# Patient Record
Sex: FEMALE | Race: BLACK OR AFRICAN AMERICAN | NOT HISPANIC OR LATINO | Employment: UNEMPLOYED | URBAN - METROPOLITAN AREA
[De-identification: names, ages, dates, MRNs, and addresses within clinical notes are randomized per-mention and may not be internally consistent; named-entity substitution may affect disease eponyms.]

---

## 2022-04-19 ENCOUNTER — TELEPHONE (OUTPATIENT)
Dept: OBSTETRICS AND GYNECOLOGY | Facility: CLINIC | Age: 32
End: 2022-04-19
Payer: MEDICAID

## 2022-04-19 NOTE — TELEPHONE ENCOUNTER
Spoke with patient and appt was made ----- Message from Stacy Henderson sent at 4/19/2022 11:55 AM CDT -----  Type: Needs Medical Advice  Who Called:  Pt  Best Call Back Number: [phone]  Additional Information: Pt requesting appt for Pregnancy conformation-Pt last menstrual started Feb 16th--please advise--thank you

## 2022-04-29 ENCOUNTER — OFFICE VISIT (OUTPATIENT)
Dept: OBSTETRICS AND GYNECOLOGY | Facility: CLINIC | Age: 32
End: 2022-04-29
Payer: MEDICAID

## 2022-04-29 VITALS
BODY MASS INDEX: 27.47 KG/M2 | WEIGHT: 175 LBS | SYSTOLIC BLOOD PRESSURE: 138 MMHG | HEIGHT: 67 IN | DIASTOLIC BLOOD PRESSURE: 98 MMHG

## 2022-04-29 DIAGNOSIS — N92.6 MISSED MENSES: Primary | ICD-10-CM

## 2022-04-29 DIAGNOSIS — Z32.01 POSITIVE URINE PREGNANCY TEST: ICD-10-CM

## 2022-04-29 DIAGNOSIS — R03.0 ELEVATED BLOOD PRESSURE READING IN OFFICE WITHOUT DIAGNOSIS OF HYPERTENSION: ICD-10-CM

## 2022-04-29 PROCEDURE — 99204 OFFICE O/P NEW MOD 45 MIN: CPT | Mod: TH,S$GLB,,

## 2022-04-29 PROCEDURE — 1160F PR REVIEW ALL MEDS BY PRESCRIBER/CLIN PHARMACIST DOCUMENTED: ICD-10-PCS | Mod: CPTII,S$GLB,,

## 2022-04-29 PROCEDURE — 3008F PR BODY MASS INDEX (BMI) DOCUMENTED: ICD-10-PCS | Mod: CPTII,S$GLB,,

## 2022-04-29 PROCEDURE — 3008F BODY MASS INDEX DOCD: CPT | Mod: CPTII,S$GLB,,

## 2022-04-29 PROCEDURE — 3075F PR MOST RECENT SYSTOLIC BLOOD PRESS GE 130-139MM HG: ICD-10-PCS | Mod: CPTII,S$GLB,,

## 2022-04-29 PROCEDURE — 3080F PR MOST RECENT DIASTOLIC BLOOD PRESSURE >= 90 MM HG: ICD-10-PCS | Mod: CPTII,S$GLB,,

## 2022-04-29 PROCEDURE — 3075F SYST BP GE 130 - 139MM HG: CPT | Mod: CPTII,S$GLB,,

## 2022-04-29 PROCEDURE — 99204 PR OFFICE/OUTPT VISIT, NEW, LEVL IV, 45-59 MIN: ICD-10-PCS | Mod: TH,S$GLB,,

## 2022-04-29 PROCEDURE — 1159F MED LIST DOCD IN RCRD: CPT | Mod: CPTII,S$GLB,,

## 2022-04-29 PROCEDURE — 1159F PR MEDICATION LIST DOCUMENTED IN MEDICAL RECORD: ICD-10-PCS | Mod: CPTII,S$GLB,,

## 2022-04-29 PROCEDURE — 3080F DIAST BP >= 90 MM HG: CPT | Mod: CPTII,S$GLB,,

## 2022-04-29 PROCEDURE — 1160F RVW MEDS BY RX/DR IN RCRD: CPT | Mod: CPTII,S$GLB,,

## 2022-04-29 NOTE — PROGRESS NOTES
"SUBJECTIVE:       Chief Complaint: Confirmation (Patient is here for a confirmation of pregnancy. )    History of Present Illness: Teressa Fu is a 32 y.o. female,  who presents for a confirmation of pregnancy.  Patient reports absent menses and a positive home pregnancy test.  She states her menses were previously regular.  She is not currently on any contraception.  Patient denies cramping, and vaginal bleeding.  She reports occasional nausea and vomiting.  She was seen at [facility] for vomiting, vaginal bleeding, and UTI.  Her BP was 151/103 at the time of her ED visit.  She is on keflex for the UTI.  She states the vaginal bleeding has resolved.  Patient reports she is not taking a prenatal vitamin.  History of  X1 and C/S x1 after accidental overdose.  Patient reports she has a history of cocaine abuse.  She delivered her second baby after ingestion of cocaine "laced with fentanyl and benzos."  She denies a history of HTN, GDM, and other pregnancy complications.  She denies cramping, adilson, pelvic pain, and vaginal bleeding today.  She states her last annual with Pap was 2022.  She does not have a history of abnormal Paps.    Review of Systems   Constitutional: Positive for fatigue. Negative for activity change, appetite change, chills, fever and unexpected weight change.   HENT: Negative for nasal congestion, dental problem, ear pain, postnasal drip, rhinorrhea, sinus pressure/congestion, sneezing and sore throat.    Eyes: Negative for discharge, visual disturbance and eye dryness.   Respiratory: Negative for cough, chest tightness and shortness of breath.    Cardiovascular: Negative for chest pain, palpitations and leg swelling.   Gastrointestinal: Positive for nausea and vomiting. Negative for abdominal distention, abdominal pain, change in bowel habit, constipation, diarrhea, reflux and change in bowel habit.   Endocrine: Negative for cold intolerance, heat intolerance, polydipsia and " "polyuria.   Genitourinary: Positive for menstrual irregularity. Negative for difficulty urinating, dyspareunia, dysuria, frequency, genital sores, hot flashes, pelvic pain, urgency, vaginal bleeding, vaginal discharge, vaginal pain and vaginal dryness.   Musculoskeletal: Negative for back pain, myalgias, neck pain and neck stiffness.   Integumentary:  Negative for rash, breast mass, breast discharge and breast tenderness.   Allergic/Immunologic: Negative for environmental allergies and immunocompromised state.   Neurological: Negative for dizziness, syncope, weakness, light-headedness, numbness and headaches.   Hematological: Negative for adenopathy. Does not bruise/bleed easily.   Psychiatric/Behavioral: Negative for confusion, decreased concentration and sleep disturbance. The patient is not nervous/anxious.    Breast: Negative for mass and tenderness        OBJECTIVE:      BP (!) 138/98   Ht 5' 7" (1.702 m)   Wt 79.4 kg (175 lb)   LMP 02/16/2022   BMI 27.41 kg/m²     Physical Exam:   Constitutional: She is oriented to person, place, and time. She appears well-developed and well-nourished. She is cooperative.    HENT:   Head: Normocephalic and atraumatic.      Cardiovascular: Normal rate and regular rhythm.     Pulmonary/Chest: Effort normal.                  Musculoskeletal: Moves all extremeties.      Right lower leg: No edema.      Left lower leg: No edema.       Neurological: She is alert and oriented to person, place, and time. GCS eye subscore is 4. GCS verbal subscore is 5. GCS motor subscore is 6.    Skin: Skin is warm and dry. Capillary refill takes less than 2 seconds.    Psychiatric: She has a normal mood and affect. Her speech is normal and behavior is normal. Mood, affect and thought content normal.         ASSESSMENT:       1. Missed menses    2. Positive urine pregnancy test    3. Elevated blood pressure reading in office without diagnosis of hypertension      PARK by LMP: 11/23/2022  EGA: 10 " weeks 2 days    PARK by reported U/S: 2022  EGA: 9 weeks 2 days      PLAN:      Missed menses  -     POCT urine pregnancy    Positive urine pregnancy test  -     US OB/GYN Procedure ([facility])-Future; Future; Expected date: 2022    Elevated blood pressure reading in office without diagnosis of hypertension    Prenatal Counseling:   Prenatal vitamins with folic acid/folate   Common complaints of pregnancy in the first trimester (nausea, vomiting, fatigue)   Foods to avoid (sushi, fish that are high in mercury, deli meat, and unpasteurized cheeses)   Routine prenatal testing including optional  genetic screening   Risk factors identified by prenatal history   Oriented to practice - discussed anticipated course of prenatal care and indications for ultrasound   Childbirth classes   Hospital facilities for delivery and emergency care   Nutrition and proper weight gain based on the [place] [employer]'s recommendations based on her pre-pregnancy weight   Toxoplasmosis precautions (cats)   Sexual activity and exercise   Environmental/work hazards   Travel   Tobacco (Ask, Advise, Assess, Assist, and Arrange), as well as alcohol and drug use   Use of any medications (including supplements, vitamins, herbs, or OTC Drugs)   Domestic violence   Seat belt use    Patient does desire genetic testing.  She does want to know the sex of the baby.  Start daily prenatal vitamin.  Ultrasound to confirm viability and PARK before next visit.  Discussed ACC/AHA blood pressure classifications and risks associated with hypertension.  Advised patient to monitor daily at home.  Will review BP log at next appointment.  Will discuss treatment options if BP remains significantly elevated at next visit.  Discussed heart-healthy diet and exercise.  Encouraged adequate water intake.  Continue Zofran PRN for nausea and vomiting.  ERLINDA from Dr. Ridley and TERRIE Pelayo for previous records.  Labs and genetic  counseling at next visit.    Follow up in about 2 weeks (around 5/13/2022) for routine OB appointment.

## 2022-05-06 ENCOUNTER — PROCEDURE VISIT (OUTPATIENT)
Dept: OBSTETRICS AND GYNECOLOGY | Facility: CLINIC | Age: 32
End: 2022-05-06
Payer: MEDICAID

## 2022-05-06 DIAGNOSIS — Z32.01 POSITIVE URINE PREGNANCY TEST: ICD-10-CM

## 2022-05-06 PROCEDURE — 76817 US OB/GYN PROCEDURE (VIEWPOINT): ICD-10-PCS | Mod: S$GLB,,, | Performed by: OBSTETRICS & GYNECOLOGY

## 2022-05-06 PROCEDURE — 76817 TRANSVAGINAL US OBSTETRIC: CPT | Mod: S$GLB,,, | Performed by: OBSTETRICS & GYNECOLOGY

## 2022-06-10 ENCOUNTER — OFFICE VISIT (OUTPATIENT)
Dept: OBSTETRICS AND GYNECOLOGY | Facility: CLINIC | Age: 32
End: 2022-06-10
Payer: MEDICAID

## 2022-06-10 VITALS — SYSTOLIC BLOOD PRESSURE: 139 MMHG | DIASTOLIC BLOOD PRESSURE: 87 MMHG | BODY MASS INDEX: 27.96 KG/M2 | WEIGHT: 178.5 LBS

## 2022-06-10 DIAGNOSIS — O09.32 LIMITED PRENATAL CARE IN SECOND TRIMESTER: ICD-10-CM

## 2022-06-10 DIAGNOSIS — O21.9 NAUSEA AND VOMITING IN PREGNANCY: ICD-10-CM

## 2022-06-10 DIAGNOSIS — Z3A.16 16 WEEKS GESTATION OF PREGNANCY: Primary | ICD-10-CM

## 2022-06-10 PROCEDURE — 87186 SC STD MICRODIL/AGAR DIL: CPT

## 2022-06-10 PROCEDURE — 87491 CHLMYD TRACH DNA AMP PROBE: CPT

## 2022-06-10 PROCEDURE — 3079F DIAST BP 80-89 MM HG: CPT | Mod: CPTII,S$GLB,,

## 2022-06-10 PROCEDURE — 3075F PR MOST RECENT SYSTOLIC BLOOD PRESS GE 130-139MM HG: ICD-10-PCS | Mod: CPTII,S$GLB,,

## 2022-06-10 PROCEDURE — 99213 PR OFFICE/OUTPT VISIT, EST, LEVL III, 20-29 MIN: ICD-10-PCS | Mod: TH,S$GLB,,

## 2022-06-10 PROCEDURE — 87591 N.GONORRHOEAE DNA AMP PROB: CPT

## 2022-06-10 PROCEDURE — 3008F BODY MASS INDEX DOCD: CPT | Mod: CPTII,S$GLB,,

## 2022-06-10 PROCEDURE — 87086 URINE CULTURE/COLONY COUNT: CPT

## 2022-06-10 PROCEDURE — 3075F SYST BP GE 130 - 139MM HG: CPT | Mod: CPTII,S$GLB,,

## 2022-06-10 PROCEDURE — 1160F RVW MEDS BY RX/DR IN RCRD: CPT | Mod: CPTII,S$GLB,,

## 2022-06-10 PROCEDURE — 3008F PR BODY MASS INDEX (BMI) DOCUMENTED: ICD-10-PCS | Mod: CPTII,S$GLB,,

## 2022-06-10 PROCEDURE — 99213 OFFICE O/P EST LOW 20 MIN: CPT | Mod: TH,S$GLB,,

## 2022-06-10 PROCEDURE — 1159F MED LIST DOCD IN RCRD: CPT | Mod: CPTII,S$GLB,,

## 2022-06-10 PROCEDURE — 1159F PR MEDICATION LIST DOCUMENTED IN MEDICAL RECORD: ICD-10-PCS | Mod: CPTII,S$GLB,,

## 2022-06-10 PROCEDURE — 3079F PR MOST RECENT DIASTOLIC BLOOD PRESSURE 80-89 MM HG: ICD-10-PCS | Mod: CPTII,S$GLB,,

## 2022-06-10 PROCEDURE — 87077 CULTURE AEROBIC IDENTIFY: CPT

## 2022-06-10 PROCEDURE — 87088 URINE BACTERIA CULTURE: CPT

## 2022-06-10 PROCEDURE — 1160F PR REVIEW ALL MEDS BY PRESCRIBER/CLIN PHARMACIST DOCUMENTED: ICD-10-PCS | Mod: CPTII,S$GLB,,

## 2022-06-10 PROCEDURE — 80307 DRUG TEST PRSMV CHEM ANLYZR: CPT

## 2022-06-10 RX ORDER — ONDANSETRON 4 MG/1
4 TABLET, ORALLY DISINTEGRATING ORAL EVERY 8 HOURS PRN
COMMUNITY
Start: 2022-04-29 | End: 2022-06-10 | Stop reason: ALTCHOICE

## 2022-06-10 RX ORDER — PROMETHAZINE HYDROCHLORIDE 25 MG/1
25 TABLET ORAL EVERY 12 HOURS PRN
Qty: 30 TABLET | Refills: 1 | Status: SHIPPED | OUTPATIENT
Start: 2022-06-10 | End: 2022-07-14 | Stop reason: SDUPTHER

## 2022-06-10 NOTE — PROGRESS NOTES
SUBJECTIVE:      Teressa Fu is a 32 y.o. female  at 16w2d presenting for a routine pregnancy visit.  Patient reports absent fetal movements. She denies cramping, contractions, vaginal bleeding and leaking.  She is taking prenatal vitamins. She complains of continued nausea unrelieved by Zofran.    Estimated Date of Delivery: 2022, by Last Menstrual Period, dating reviewed.  Prenatal labs ordered to be completed today.    Review of Systems   Constitutional: Negative for activity change, appetite change, chills, fatigue, fever and unexpected weight change.   HENT: Negative for nasal congestion, dental problem, ear pain, postnasal drip, rhinorrhea, sinus pressure/congestion, sneezing and sore throat.    Eyes: Negative for discharge, visual disturbance and eye dryness.   Respiratory: Negative for cough, chest tightness and shortness of breath.    Cardiovascular: Negative for chest pain, palpitations and leg swelling.   Gastrointestinal: Negative for abdominal distention, abdominal pain, change in bowel habit, constipation, diarrhea, vomiting, reflux and change in bowel habit. POSITIVE for nausea.  Genitourinary: Negative for difficulty urinating, dyspareunia, dysuria, flank pain, frequency, urgency, cramps, contractions, vaginal bleeding, vaginal discharge and vaginal pain.  Musculoskeletal: Negative for back pain and myalgias.   Integumentary:  Negative for rash, breast mass, and breast discharge.  Neurological: Negative for dizziness, syncope, weakness, light-headedness, numbness, headaches, disturbances or difficulties in coordination.  Hematological: Negative for unexplained bruising or bleeding.  Psychiatric/Behavioral: Negative sleep disturbance. The patient is not nervous/anxious.    OBJECTIVE:      /87   Wt 81 kg (178 lb 8 oz)   LMP 2022   BMI 27.96 kg/m²     Physical Exam  Constitutional: She is oriented to person, place, and time. She appears well-developed and well-nourished.     Head: Normocephalic and atraumatic.      Cardiovascular: Normal rate and regular rhythm.  Pulmonary/Chest: Effort normal.      Abdominal: Soft. Gravid. There is no abdominal tenderness.    Genitourinary: Deferred  Musculoskeletal: Moves all extremeties.       Neurological: She is alert and oriented to person, place, and time. GCS eye subscore is 4. GCS verbal subscore is 5. GCS motor subscore is 6.    Skin: Skin is warm and dry. Capillary refill takes less than 2 seconds.    Psychiatric: Her speech is normal and behavior is normal. Mood, affect and thought content normal.    ASSESSMENT:       1. 16 weeks gestation of pregnancy    2. Nausea and vomiting in pregnancy    3. History of maternal substance abuse affecting     4. Limited prenatal care in second trimester      PLAN:   16 weeks gestation of pregnancy  -     CBC Auto Differential; Future; Expected date: 06/10/2022  -     Comprehensive Metabolic Panel; Future; Expected date: 06/10/2022  -     HIV 1/2 Ag/Ab (4th Gen); Future; Expected date: 06/10/2022  -     RPR; Future; Expected date: 06/10/2022  -     Hepatitis Panel, Acute; Future; Expected date: 06/10/2022  -     C. trachomatis/N. gonorrhoeae by AMP DNA [facility]; Urine  -     Toxicology Screen, Urine  -     Urine culture  -     Rubella Antibody, IgG; Future; Expected date: 06/10/2022  -     Type & Screen - Ob Profile; Future; Expected date: 06/10/2022  -     Genetic Misc Sendout Test, Blood; Future; Expected date: 06/10/2022  -      OB/GYN Procedure ([facility])-Future; Future; Expected date: 2022    Nausea and vomiting in pregnancy  -     promethazine (PHENERGAN) 25 MG tablet; Take 1 tablet (25 mg total) by mouth every 12 (twelve) hours as needed for Nausea.  Dispense: 30 tablet; Refill: 1    History of maternal substance abuse affecting     Limited prenatal care in second trimester  -      OB/GYN Procedure ([facility])-Future; Future; Expected date: 2022    Fetal movement  counts, bleeding, pain, and labor precautions reviewed.    Follow-up in 4 week(s) for routine OB appointment and anatomy U/S

## 2022-06-11 LAB
AMPHET+METHAMPHET UR QL: NEGATIVE
BARBITURATES UR QL SCN>200 NG/ML: NEGATIVE
BENZODIAZ UR QL SCN>200 NG/ML: ABNORMAL
BZE UR QL SCN: ABNORMAL
CANNABINOIDS UR QL SCN: ABNORMAL
CREAT UR-MCNC: 213 MG/DL (ref 15–325)
ETHANOL UR-MCNC: <10 MG/DL
METHADONE UR QL SCN>300 NG/ML: NEGATIVE
OPIATES UR QL SCN: NEGATIVE
PCP UR QL SCN>25 NG/ML: NEGATIVE
TOXICOLOGY INFORMATION: ABNORMAL

## 2022-06-13 DIAGNOSIS — R82.71 BACTERIURIA DURING PREGNANCY IN SECOND TRIMESTER: Primary | ICD-10-CM

## 2022-06-13 DIAGNOSIS — O99.891 BACTERIURIA DURING PREGNANCY IN SECOND TRIMESTER: Primary | ICD-10-CM

## 2022-06-13 LAB — BACTERIA UR CULT: ABNORMAL

## 2022-06-13 RX ORDER — NITROFURANTOIN 25; 75 MG/1; MG/1
100 CAPSULE ORAL 2 TIMES DAILY
Qty: 14 CAPSULE | Refills: 0 | Status: SHIPPED | OUTPATIENT
Start: 2022-06-13 | End: 2022-06-20

## 2022-06-13 NOTE — PROGRESS NOTES
Rx sent to pharmacy. Patient notified via portal message. Please call patient to ensure she is aware.

## 2022-06-14 LAB
C TRACH DNA SPEC QL NAA+PROBE: NOT DETECTED
N GONORRHOEA DNA SPEC QL NAA+PROBE: NOT DETECTED

## 2022-06-28 ENCOUNTER — ROUTINE PRENATAL (OUTPATIENT)
Dept: OBSTETRICS AND GYNECOLOGY | Facility: CLINIC | Age: 32
End: 2022-06-28
Payer: MEDICAID

## 2022-06-28 VITALS
DIASTOLIC BLOOD PRESSURE: 74 MMHG | HEART RATE: 94 BPM | SYSTOLIC BLOOD PRESSURE: 132 MMHG | WEIGHT: 184 LBS | BODY MASS INDEX: 28.82 KG/M2

## 2022-06-28 DIAGNOSIS — R82.71 BACTERIURIA DURING PREGNANCY IN SECOND TRIMESTER: ICD-10-CM

## 2022-06-28 DIAGNOSIS — O99.320 DRUG ABUSE DURING PREGNANCY: ICD-10-CM

## 2022-06-28 DIAGNOSIS — F19.10 DRUG ABUSE DURING PREGNANCY: ICD-10-CM

## 2022-06-28 DIAGNOSIS — Z3A.18 18 WEEKS GESTATION OF PREGNANCY: Primary | ICD-10-CM

## 2022-06-28 DIAGNOSIS — O99.891 BACTERIURIA DURING PREGNANCY IN SECOND TRIMESTER: ICD-10-CM

## 2022-06-28 DIAGNOSIS — F17.200 CURRENT EVERY DAY SMOKER: ICD-10-CM

## 2022-06-28 PROCEDURE — 87086 URINE CULTURE/COLONY COUNT: CPT

## 2022-06-28 PROCEDURE — 99213 OFFICE O/P EST LOW 20 MIN: CPT | Mod: TH,S$GLB,,

## 2022-06-28 PROCEDURE — 99213 PR OFFICE/OUTPT VISIT, EST, LEVL III, 20-29 MIN: ICD-10-PCS | Mod: TH,S$GLB,,

## 2022-06-28 NOTE — PROGRESS NOTES
SUBJECTIVE:      Teressa Fu is a 32 y.o. female  at 18w6d presenting for a routine pregnancy visit.  Patient reports good fetal movements. She denies cramping, contractions, vaginal bleeding and leaking.  She is taking prenatal vitamins.    Estimated Date of Delivery: 2022, by Last Menstrual Period, dating reviewed.  Prenatal labs reviewed.    Review of Systems   Constitutional: Negative for activity change, appetite change, chills, fatigue, fever and unexpected weight change.   HENT: Negative for nasal congestion, dental problem, ear pain, postnasal drip, rhinorrhea, sinus pressure/congestion, sneezing and sore throat.    Eyes: Negative for discharge, visual disturbance and eye dryness.   Respiratory: Negative for cough, chest tightness and shortness of breath.    Cardiovascular: Negative for chest pain, palpitations and leg swelling.   Gastrointestinal: Negative for abdominal distention, abdominal pain, change in bowel habit, constipation, diarrhea, nausea, vomiting, reflux and change in bowel habit.  Genitourinary: Negative for difficulty urinating, dyspareunia, dysuria, flank pain, frequency, urgency, cramps, contractions, vaginal bleeding, vaginal discharge and vaginal pain.  Musculoskeletal: Negative for back pain and myalgias.   Integumentary:  Negative for rash, breast mass, and breast discharge.  Neurological: Negative for dizziness, syncope, weakness, light-headedness, numbness, headaches, disturbances or difficulties in coordination.  Hematological: Negative for unexplained bruising or bleeding.  Psychiatric/Behavioral: Negative sleep disturbance. The patient is not nervous/anxious.    OBJECTIVE:      /74   Pulse 94   Wt 83.5 kg (184 lb)   LMP 2022   BMI 28.82 kg/m²     Physical Exam  Constitutional: She is oriented to person, place, and time. She appears well-developed and well-nourished.    Head: Normocephalic and atraumatic.      Cardiovascular: Normal rate and  regular rhythm.  Pulmonary/Chest: Effort normal.      Abdominal: Soft. Gravid. There is no abdominal tenderness.    Genitourinary: Deferred  Musculoskeletal: Moves all extremeties.       Neurological: She is alert and oriented to person, place, and time. GCS eye subscore is 4. GCS verbal subscore is 5. GCS motor subscore is 6.    Skin: Skin is warm and dry. Capillary refill takes less than 2 seconds.    Psychiatric: Her speech is normal and behavior is normal. Mood, affect and thought content normal.    ASSESSMENT:       1. 18 weeks gestation of pregnancy    2. Bacteriuria during pregnancy in second trimester    3. Drug abuse during pregnancy    4. Current every day smoker    5. History of maternal substance abuse affecting       PLAN:   18 weeks gestation of pregnancy    Bacteriuria during pregnancy in second trimester  -     Urine culture    Drug abuse during pregnancy    Current every day smoker  -     Ambulatory referral/consult to Smoking Cessation Program; Future; Expected date: 2022    History of maternal substance abuse affecting     Repeat urine culture today s/p treatment for previous UTI. Multi-substance abuse counseling provided for UDS positive for cocaine, benzos, and THC. Advised patient of risks to she and fetus during pregnancy. Offered resources for treatment. Patient declined assistance at this time. Fetal movement counts, bleeding, pain, and labor precautions reviewed. Reassurance provided. All questions answered. Patient verbalized understanding.    Follow-up in 4 week(s) for routine OB appointment.

## 2022-06-29 LAB — BACTERIA UR CULT: NO GROWTH

## 2022-07-01 ENCOUNTER — PATIENT MESSAGE (OUTPATIENT)
Dept: OBSTETRICS AND GYNECOLOGY | Facility: CLINIC | Age: 32
End: 2022-07-01
Payer: MEDICAID

## 2022-07-01 DIAGNOSIS — O21.9 NAUSEA AND VOMITING IN PREGNANCY: ICD-10-CM

## 2022-07-01 RX ORDER — PROMETHAZINE HYDROCHLORIDE 25 MG/1
25 TABLET ORAL EVERY 12 HOURS PRN
Qty: 30 TABLET | Refills: 1 | OUTPATIENT
Start: 2022-07-01

## 2022-07-05 NOTE — TELEPHONE ENCOUNTER
Genetic screening results are within normal range. Please notify patient and provide gender results if she desires.

## 2022-07-11 ENCOUNTER — PATIENT MESSAGE (OUTPATIENT)
Dept: OBSTETRICS AND GYNECOLOGY | Facility: CLINIC | Age: 32
End: 2022-07-11
Payer: MEDICAID

## 2022-07-14 ENCOUNTER — PROCEDURE VISIT (OUTPATIENT)
Dept: MATERNAL FETAL MEDICINE | Facility: CLINIC | Age: 32
End: 2022-07-14
Payer: MEDICAID

## 2022-07-14 ENCOUNTER — ROUTINE PRENATAL (OUTPATIENT)
Dept: OBSTETRICS AND GYNECOLOGY | Facility: CLINIC | Age: 32
End: 2022-07-14
Payer: MEDICAID

## 2022-07-14 VITALS
SYSTOLIC BLOOD PRESSURE: 132 MMHG | WEIGHT: 186 LBS | HEART RATE: 100 BPM | BODY MASS INDEX: 29.13 KG/M2 | DIASTOLIC BLOOD PRESSURE: 70 MMHG

## 2022-07-14 DIAGNOSIS — Z3A.16 16 WEEKS GESTATION OF PREGNANCY: ICD-10-CM

## 2022-07-14 DIAGNOSIS — O09.32 LIMITED PRENATAL CARE IN SECOND TRIMESTER: ICD-10-CM

## 2022-07-14 DIAGNOSIS — F17.200 CURRENT EVERY DAY SMOKER: ICD-10-CM

## 2022-07-14 DIAGNOSIS — O21.9 NAUSEA AND VOMITING IN PREGNANCY: ICD-10-CM

## 2022-07-14 DIAGNOSIS — Z3A.21 21 WEEKS GESTATION OF PREGNANCY: Primary | ICD-10-CM

## 2022-07-14 PROCEDURE — 99213 PR OFFICE/OUTPT VISIT, EST, LEVL III, 20-29 MIN: ICD-10-PCS | Mod: TH,S$GLB,,

## 2022-07-14 PROCEDURE — 99213 OFFICE O/P EST LOW 20 MIN: CPT | Mod: TH,S$GLB,,

## 2022-07-14 PROCEDURE — 76805 OB US >/= 14 WKS SNGL FETUS: CPT | Mod: S$GLB,,, | Performed by: OBSTETRICS & GYNECOLOGY

## 2022-07-14 PROCEDURE — 76805 US OB/GYN PROCEDURE (VIEWPOINT): ICD-10-PCS | Mod: S$GLB,,, | Performed by: OBSTETRICS & GYNECOLOGY

## 2022-07-14 RX ORDER — PROMETHAZINE HYDROCHLORIDE 25 MG/1
25 TABLET ORAL EVERY 12 HOURS PRN
Qty: 30 TABLET | Refills: 1 | Status: SHIPPED | OUTPATIENT
Start: 2022-07-14

## 2022-07-14 NOTE — PROGRESS NOTES
"SUBJECTIVE:      Teressa Fu is a 32 y.o. female  at 21w1d presenting for a routine pregnancy visit.  Patient reports good fetal movements. She denies cramping, contractions, vaginal bleeding and leaking.  She is taking prenatal vitamins. She complains of continued nausea and vomiting well-managed with phenergan. Anatomy U/S today. Patient states she is "down to 6 cigarettes per day."    Estimated Date of Delivery: 2022, by Last Menstrual Period, dating reviewed.  Prenatal labs reviewed.    Review of Systems   Constitutional: Negative for activity change, appetite change, chills, fatigue, fever and unexpected weight change.   HENT: Negative for nasal congestion, dental problem, ear pain, postnasal drip, rhinorrhea, sinus pressure/congestion, sneezing and sore throat.    Eyes: Negative for discharge, visual disturbance and eye dryness.   Respiratory: Negative for cough, chest tightness and shortness of breath.    Cardiovascular: Negative for chest pain, palpitations and leg swelling.   Gastrointestinal: Negative for abdominal distention, abdominal pain, change in bowel habit, constipation, diarrhea, reflux and change in bowel habit. POSITIVE for nausea and vomiting.  Genitourinary: Negative for difficulty urinating, dyspareunia, dysuria, flank pain, frequency, urgency, cramps, contractions, vaginal bleeding, vaginal discharge and vaginal pain.  Musculoskeletal: Negative for back pain and myalgias.   Integumentary:  Negative for rash, breast mass, and breast discharge.  Neurological: Negative for dizziness, syncope, weakness, light-headedness, numbness, headaches, disturbances or difficulties in coordination.  Hematological: Negative for unexplained bruising or bleeding.  Psychiatric/Behavioral: Negative sleep disturbance. The patient is not nervous/anxious.    OBJECTIVE:      /70   Pulse 100   Wt 84.4 kg (186 lb)   LMP 2022   BMI 29.13 kg/m²     Physical Exam  Constitutional: She is " oriented to person, place, and time. She appears well-developed and well-nourished.    Head: Normocephalic and atraumatic.      Cardiovascular: Normal rate and regular rhythm.  Pulmonary/Chest: Effort normal.      Abdominal: Soft. Gravid. There is no abdominal tenderness.    Genitourinary: Deferred  Musculoskeletal: Moves all extremeties.       Neurological: She is alert and oriented to person, place, and time. GCS eye subscore is 4. GCS verbal subscore is 5. GCS motor subscore is 6.    Skin: Skin is warm and dry. Capillary refill takes less than 2 seconds.    Psychiatric: Her speech is normal and behavior is normal. Mood, affect and thought content normal.    ASSESSMENT:       1. 21 weeks gestation of pregnancy    2. Nausea and vomiting in pregnancy    3. Current every day smoker      PLAN:   21 weeks gestation of pregnancy    Nausea and vomiting in pregnancy  -     promethazine (PHENERGAN) 25 MG tablet; Take 1 tablet (25 mg total) by mouth every 12 (twelve) hours as needed for Nausea.  Dispense: 30 tablet; Refill: 1    Current every day smoker    Smoking cessation counseling provided. Fetal movement counts, bleeding, pain, and labor precautions reviewed. Reassurance provided. All questions answered. Patient verbalized understanding.    Follow-up in 4 week(s) for routine OB appointment.

## 2022-07-19 ENCOUNTER — TELEPHONE (OUTPATIENT)
Dept: SMOKING CESSATION | Facility: CLINIC | Age: 32
End: 2022-07-19
Payer: MEDICAID

## 2022-07-20 ENCOUNTER — TELEPHONE (OUTPATIENT)
Dept: SMOKING CESSATION | Facility: CLINIC | Age: 32
End: 2022-07-20
Payer: MEDICAID

## 2022-07-20 NOTE — TELEPHONE ENCOUNTER
Patient had not arrived for the virtual intake appt. Called to confirm. Patient answered and stated that she just started a new job and needed to reschedule. Reschedule appt for 8/1/22.

## 2022-07-25 ENCOUNTER — PATIENT MESSAGE (OUTPATIENT)
Dept: OBSTETRICS AND GYNECOLOGY | Facility: CLINIC | Age: 32
End: 2022-07-25
Payer: MEDICAID

## 2022-08-01 ENCOUNTER — TELEPHONE (OUTPATIENT)
Dept: SMOKING CESSATION | Facility: CLINIC | Age: 32
End: 2022-08-01
Payer: MEDICAID

## 2022-08-01 NOTE — TELEPHONE ENCOUNTER
Attempted to follow up with patient regarding canceled virtual intake appointment. Patient answer ans reschedule for 8/10 at 2pm

## 2022-08-09 ENCOUNTER — TELEPHONE (OUTPATIENT)
Dept: SMOKING CESSATION | Facility: CLINIC | Age: 32
End: 2022-08-09
Payer: MEDICAID

## 2022-08-10 ENCOUNTER — TELEPHONE (OUTPATIENT)
Dept: SMOKING CESSATION | Facility: CLINIC | Age: 32
End: 2022-08-10
Payer: MEDICAID

## 2022-08-10 ENCOUNTER — PATIENT MESSAGE (OUTPATIENT)
Dept: ADMINISTRATIVE | Facility: OTHER | Age: 32
End: 2022-08-10
Payer: MEDICAID

## 2022-10-12 ENCOUNTER — PATIENT MESSAGE (OUTPATIENT)
Dept: OBSTETRICS AND GYNECOLOGY | Facility: CLINIC | Age: 32
End: 2022-10-12
Payer: MEDICAID

## 2022-10-19 ENCOUNTER — ROUTINE PRENATAL (OUTPATIENT)
Dept: OBSTETRICS AND GYNECOLOGY | Facility: CLINIC | Age: 32
End: 2022-10-19
Payer: MEDICAID

## 2022-10-19 VITALS
BODY MASS INDEX: 29.48 KG/M2 | SYSTOLIC BLOOD PRESSURE: 115 MMHG | HEART RATE: 92 BPM | WEIGHT: 188.19 LBS | DIASTOLIC BLOOD PRESSURE: 80 MMHG

## 2022-10-19 DIAGNOSIS — F17.200 CURRENT EVERY DAY SMOKER: ICD-10-CM

## 2022-10-19 DIAGNOSIS — O35.03X1 CHOROID PLEXUS CYST OF FETUS AFFECTING CARE OF MOTHER, ANTEPARTUM, FETUS 1: ICD-10-CM

## 2022-10-19 DIAGNOSIS — Z3A.35 35 WEEKS GESTATION OF PREGNANCY: ICD-10-CM

## 2022-10-19 DIAGNOSIS — O99.320 DRUG ABUSE DURING PREGNANCY: ICD-10-CM

## 2022-10-19 DIAGNOSIS — O09.93 SUPERVISION OF HIGH RISK PREGNANCY IN THIRD TRIMESTER: Primary | ICD-10-CM

## 2022-10-19 DIAGNOSIS — O34.219 HISTORY OF CESAREAN DELIVERY, CURRENTLY PREGNANT: ICD-10-CM

## 2022-10-19 DIAGNOSIS — O09.33 LIMITED PRENATAL CARE IN THIRD TRIMESTER: ICD-10-CM

## 2022-10-19 DIAGNOSIS — F19.10 DRUG ABUSE DURING PREGNANCY: ICD-10-CM

## 2022-10-19 PROCEDURE — 59425 ANTEPARTUM CARE ONLY: CPT | Mod: TH,S$GLB,, | Performed by: STUDENT IN AN ORGANIZED HEALTH CARE EDUCATION/TRAINING PROGRAM

## 2022-10-19 PROCEDURE — 90715 TDAP VACCINE GREATER THAN OR EQUAL TO 7YO IM: ICD-10-PCS | Mod: S$GLB,,, | Performed by: STUDENT IN AN ORGANIZED HEALTH CARE EDUCATION/TRAINING PROGRAM

## 2022-10-19 PROCEDURE — 90471 IMMUNIZATION ADMIN: CPT | Mod: S$GLB,,, | Performed by: STUDENT IN AN ORGANIZED HEALTH CARE EDUCATION/TRAINING PROGRAM

## 2022-10-19 PROCEDURE — 87491 CHLMYD TRACH DNA AMP PROBE: CPT | Performed by: STUDENT IN AN ORGANIZED HEALTH CARE EDUCATION/TRAINING PROGRAM

## 2022-10-19 PROCEDURE — 80307 DRUG TEST PRSMV CHEM ANLYZR: CPT | Performed by: STUDENT IN AN ORGANIZED HEALTH CARE EDUCATION/TRAINING PROGRAM

## 2022-10-19 PROCEDURE — 90715 TDAP VACCINE 7 YRS/> IM: CPT | Mod: S$GLB,,, | Performed by: STUDENT IN AN ORGANIZED HEALTH CARE EDUCATION/TRAINING PROGRAM

## 2022-10-19 PROCEDURE — 90471 TDAP VACCINE GREATER THAN OR EQUAL TO 7YO IM: ICD-10-PCS | Mod: S$GLB,,, | Performed by: STUDENT IN AN ORGANIZED HEALTH CARE EDUCATION/TRAINING PROGRAM

## 2022-10-19 PROCEDURE — 87591 N.GONORRHOEAE DNA AMP PROB: CPT | Performed by: STUDENT IN AN ORGANIZED HEALTH CARE EDUCATION/TRAINING PROGRAM

## 2022-10-19 PROCEDURE — 59425 PR ANTEPARTUM CARE ONLY, 4-6 VISITS: ICD-10-PCS | Mod: TH,S$GLB,, | Performed by: STUDENT IN AN ORGANIZED HEALTH CARE EDUCATION/TRAINING PROGRAM

## 2022-10-19 NOTE — PROGRESS NOTES
"SUBJECTIVE:      Teressa Fu is a 32 y.o. female  at 21w1d presenting for a routine pregnancy visit.  Patient reports good fetal movements. She denies cramping, contractions, vaginal bleeding and leaking.  She is taking prenatal vitamins. She complains of continued nausea and vomiting well-managed with phenergan. Anatomy U/S today. Patient states she is "down to 6 cigarettes per day."      Review of Systems   Constitutional: Negative for activity change, appetite change, chills, fatigue, fever and unexpected weight change.   HENT: Negative for nasal congestion, dental problem, ear pain, postnasal drip, rhinorrhea, sinus pressure/congestion, sneezing and sore throat.    Eyes: Negative for discharge, visual disturbance and eye dryness.   Respiratory: Negative for cough, chest tightness and shortness of breath.    Cardiovascular: Negative for chest pain, palpitations and leg swelling.   Gastrointestinal: Negative for abdominal distention, abdominal pain, change in bowel habit, constipation, diarrhea, reflux and change in bowel habit. POSITIVE for nausea and vomiting.  Genitourinary: Negative for difficulty urinating, dyspareunia, dysuria, flank pain, frequency, urgency, cramps, contractions, vaginal bleeding, vaginal discharge and vaginal pain.  Musculoskeletal: Negative for back pain and myalgias.   Integumentary:  Negative for rash, breast mass, and breast discharge.  Neurological: Negative for dizziness, syncope, weakness, light-headedness, numbness, headaches, disturbances or difficulties in coordination.  Hematological: Negative for unexplained bruising or bleeding.  Psychiatric/Behavioral: Negative sleep disturbance. The patient is not nervous/anxious.    OBJECTIVE:      /80   Pulse 92   Wt 85.4 kg (188 lb 3.2 oz)   LMP 2022   BMI 29.48 kg/m²     Physical Exam  Constitutional: She is oriented to person, place, and time. She appears well-developed and well-nourished.    Head: " Normocephalic and atraumatic.      Cardiovascular: Normal rate and regular rhythm.  Pulmonary/Chest: Effort normal.      Abdominal: Soft. Gravid. There is no abdominal tenderness.    Genitourinary: Deferred  Musculoskeletal: Moves all extremeties.       Neurological: She is alert and oriented to person, place, and time. GCS eye subscore is 4. GCS verbal subscore is 5. GCS motor subscore is 6.    Skin: Skin is warm and dry. Capillary refill takes less than 2 seconds.    Psychiatric: Her speech is normal and behavior is normal. Mood, affect and thought content normal.    ASSESSMENT:       No diagnosis found.  PLAN:   There are no diagnoses linked to this encounter.Smoking cessation counseling provided. Fetal movement counts, bleeding, pain, and labor precautions reviewed. Reassurance provided. All questions answered. Patient verbalized understanding.    Follow-up in 4 week(s) for routine OB appointment.

## 2022-10-20 LAB
AMPHET+METHAMPHET UR QL: NEGATIVE
BARBITURATES UR QL SCN>200 NG/ML: NEGATIVE
BENZODIAZ UR QL SCN>200 NG/ML: NEGATIVE
BZE UR QL SCN: ABNORMAL
CANNABINOIDS UR QL SCN: ABNORMAL
CREAT UR-MCNC: 75 MG/DL (ref 15–325)
ETHANOL UR-MCNC: <10 MG/DL
METHADONE UR QL SCN>300 NG/ML: NEGATIVE
OPIATES UR QL SCN: NEGATIVE
PCP UR QL SCN>25 NG/ML: NEGATIVE
TOXICOLOGY INFORMATION: ABNORMAL

## 2022-10-20 NOTE — PROGRESS NOTES
10/19/2022  Chief Complaint   Patient presents with    Routine Prenatal Visit     35w0d  PARK:2022     32 y.o.  at 35w0d  Estimated Date of Delivery: 2022, by Last Menstrual Period, dating reviewed.      Patient reports: Good fetal movements reported. No Bleeding, leakage of fluid or contractions . She is doing well.  She is taking prenatal vitamins. Ms. Fu has not been since by our group since 21 weeks gestation. 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 Has not had prenatal care since she left. Denies any issues with the pregnancy since the last visit in July. She has no complaints today. She reports she has been able to stop cigarette use completely. She attest to avoiding cocaine and benzos use, states she only uses THC on occasion.  Patient is transferring care to me today. Hx is significant for hx of drug abuse. She has a hx of  1 , followed by a  at 36w,.Patient attests this was an emergent  after she became unconscious and the baby was in distress.' On chart review, I see pt had an overdose of ' cocaine laced with Fentanyl and Benzos.'   Prenatal labs reviewed.    Prenatal labs reviewed and updated today  - Blood type: A(+), antibody neg  - HIV, RPR, Hepatitis panel neg  - GC/CT neg  - Rubella immune  - H/H .  - U tox (+) Benzodiazepines, cocaine and THC  - NIPT neg, male  - Urine culture (+) E. Coli, HENRY neg  - GCT not done    - Anatomy scan suboptimal, bilateral choroid plexus cysts noted as well    OB History    Para Term  AB Living   5 2 1 1 2 2   SAB IAB Ectopic Multiple Live Births   2 0 0 0 2      # Outcome Date GA Lbr Kai/2nd Weight Sex Delivery Anes PTL Lv   5 Current            4 SAB 21     SAB      3  20 36w4d   F CS-LTranv Gen N OSIEL      Birth Comments: Emergency C/S d/t maternal accidental overdose      Complications: Drug abuse during pregnancy   2  Term 09 40w0d  2.863 kg (6 lb 5 oz) F Vag-Spont  N OSIEL   1 SAB     M   N FD       Review of Systems:  General ROS: negative for headache or visual changes  Breast ROS: negative for breast lumps  Gastrointestinal ROS: negative for constipation, diarrhea or nausea/vomiting  Musculoskeletal ROS: negative for pain in joints or swelling in face or hands.   Neurological ROS: negative for - headaches, numbness/tingling or visual changes      Physical Exam:  /80   Pulse 92   Wt 85.4 kg (188 lb 3.2 oz)   LMP 2022   BMI 29.48 kg/m²     Constitutional: She is oriented to person, place, and time. She appears well-developed and well-nourished. No distress.     Pulmonary/Chest: Effort normal. No respiratory distress  Abdominal: Soft, gravid, nontender. No rebound and no guarding.   Genitourinary: Deferred   Musculoskeletal: Normal range of motion, Minimal peripheral edema.   Neurological: She is alert and oriented to person, place, and time. Coordination normal.   Skin: Skin is warm and dry. She is not diaphoretic.  Psychiatric: She has a normal mood and affect.      Assessment:  Overall doing well.   32 y.o.  at 35w0d by LMP c/w 10w4d sono  Patient Active Problem List   Diagnosis    History of maternal substance abuse affecting     Limited prenatal care in second trimester    Drug abuse during pregnancy    Current every day smoker     Current Outpatient Medications on File Prior to Visit   Medication Sig Dispense Refill    prenatal vit37/iron/folic acid (PRENATA ORAL) Take by mouth.      promethazine (PHENERGAN) 25 MG tablet Take 1 tablet (25 mg total) by mouth every 12 (twelve) hours as needed for Nausea. (Patient not taking: Reported on 10/19/2022) 30 tablet 1     No current facility-administered medications on file prior to visit.     Supervision of high risk pregnancy in third trimester    Choroid plexus cyst of fetus affecting care of mother, antepartum, fetus 1  -      OB/GYN  Procedure ([facility]); Future    35 weeks gestation of pregnancy  -     CBC Auto Differential; Future; Expected date: 10/19/2022  -     HIV 1/2 Ag/Ab (4th Gen); Future; Expected date: 10/19/2022  -     Hepatitis Panel, Acute; Future; Expected date: 10/19/2022  -     RPR; Future; Expected date: 10/19/2022  -     C. trachomatis/N. gonorrhoeae by AMP DNA [facility]; Urine  -     Toxicology screen, urine  -     OB Glucose Screen; Future; Expected date: 10/19/2022    Current every day smoker    Drug abuse during pregnancy    History of maternal substance abuse affecting     Limited prenatal care in third trimester    History of  delivery, currently pregnant    Other orders  -     (In Office Administered) Tdap Vaccine     Plan:    - F/u anatomy scan, GCT and 3rd trim labs ordered. GBS next visit  - Applauded her on avoiding current use of nicotine and illicit substances. Continued cessation encouraged. UDS today  - Discussed mode of delivery options; TOLAC vs scheduled repeat . R/b of both discussed. She desires TOLAC. Predicted chance of success 84% by NICHD calculator. Risk including uterine rupture, hemorrhage, and failed TOLAC requiring  delivery discussed. Patient would like to proceed with TOLAC. Aware chances are better if she goes into spontaneous labor rather than induction. I am ok with plan for TOLAC.  - Encouraged hydration, daily PNVs and f/u prenatal visits strongly encouraged  - Follow up 1 Weeks, bleeding/pain precautions, kick counts,  labor precautions discussed.

## 2022-10-21 LAB
C TRACH DNA SPEC QL NAA+PROBE: NOT DETECTED
N GONORRHOEA DNA SPEC QL NAA+PROBE: NOT DETECTED

## 2022-10-27 ENCOUNTER — PROCEDURE VISIT (OUTPATIENT)
Dept: MATERNAL FETAL MEDICINE | Facility: CLINIC | Age: 32
End: 2022-10-27
Payer: MEDICAID

## 2022-10-27 DIAGNOSIS — O35.03X1 CHOROID PLEXUS CYST OF FETUS AFFECTING CARE OF MOTHER, ANTEPARTUM, FETUS 1: ICD-10-CM

## 2022-10-27 PROCEDURE — 76816 US OB/GYN PROCEDURE (VIEWPOINT): ICD-10-PCS | Mod: S$GLB,,, | Performed by: OBSTETRICS & GYNECOLOGY

## 2022-10-27 PROCEDURE — 76816 OB US FOLLOW-UP PER FETUS: CPT | Mod: S$GLB,,, | Performed by: OBSTETRICS & GYNECOLOGY

## 2022-10-28 ENCOUNTER — ROUTINE PRENATAL (OUTPATIENT)
Dept: OBSTETRICS AND GYNECOLOGY | Facility: CLINIC | Age: 32
End: 2022-10-28
Payer: MEDICAID

## 2022-10-28 VITALS
SYSTOLIC BLOOD PRESSURE: 128 MMHG | HEART RATE: 88 BPM | BODY MASS INDEX: 29.29 KG/M2 | DIASTOLIC BLOOD PRESSURE: 78 MMHG | WEIGHT: 187 LBS

## 2022-10-28 DIAGNOSIS — Z3A.36 36 WEEKS GESTATION OF PREGNANCY: ICD-10-CM

## 2022-10-28 DIAGNOSIS — O09.32 LIMITED PRENATAL CARE IN SECOND TRIMESTER: ICD-10-CM

## 2022-10-28 DIAGNOSIS — O36.5990 INTRAUTERINE GROWTH RESTRICTION AFFECTING ANTEPARTUM CARE OF MOTHER, SINGLE OR UNSPECIFIED FETUS: ICD-10-CM

## 2022-10-28 DIAGNOSIS — F19.10 DRUG ABUSE DURING PREGNANCY: ICD-10-CM

## 2022-10-28 DIAGNOSIS — O99.320 DRUG ABUSE DURING PREGNANCY: ICD-10-CM

## 2022-10-28 DIAGNOSIS — F17.200 CURRENT EVERY DAY SMOKER: ICD-10-CM

## 2022-10-28 DIAGNOSIS — O09.93 SUPERVISION OF HIGH RISK PREGNANCY IN THIRD TRIMESTER: Primary | ICD-10-CM

## 2022-10-28 PROCEDURE — 87147 CULTURE TYPE IMMUNOLOGIC: CPT | Performed by: STUDENT IN AN ORGANIZED HEALTH CARE EDUCATION/TRAINING PROGRAM

## 2022-10-28 PROCEDURE — 87591 N.GONORRHOEAE DNA AMP PROB: CPT | Performed by: STUDENT IN AN ORGANIZED HEALTH CARE EDUCATION/TRAINING PROGRAM

## 2022-10-28 PROCEDURE — 87491 CHLMYD TRACH DNA AMP PROBE: CPT | Performed by: STUDENT IN AN ORGANIZED HEALTH CARE EDUCATION/TRAINING PROGRAM

## 2022-10-28 PROCEDURE — 59425 PR ANTEPARTUM CARE ONLY, 4-6 VISITS: ICD-10-PCS | Mod: TH,S$GLB,, | Performed by: STUDENT IN AN ORGANIZED HEALTH CARE EDUCATION/TRAINING PROGRAM

## 2022-10-28 PROCEDURE — 87081 CULTURE SCREEN ONLY: CPT | Performed by: STUDENT IN AN ORGANIZED HEALTH CARE EDUCATION/TRAINING PROGRAM

## 2022-10-28 PROCEDURE — 59425 ANTEPARTUM CARE ONLY: CPT | Mod: TH,S$GLB,, | Performed by: STUDENT IN AN ORGANIZED HEALTH CARE EDUCATION/TRAINING PROGRAM

## 2022-10-28 PROCEDURE — 87184 SC STD DISK METHOD PER PLATE: CPT | Performed by: STUDENT IN AN ORGANIZED HEALTH CARE EDUCATION/TRAINING PROGRAM

## 2022-10-28 NOTE — PROGRESS NOTES
10/28/2022  Chief Complaint   Patient presents with    Routine Prenatal Visit     Patient is here for ROASNNA. Patient is 36w2d.     32 y.o.  at 36w2d  Estimated Date of Delivery: 2022, by Last Menstrual Period, dating reviewed.      Patient reports: Good fetal movements reported. No Bleeding, leakage of fluid or contractions . She is doing well.  She is taking prenatal vitamins.  No complaints reported today.  Patient reports she continues to stay away from cigarette, and illicit substances during her pregnancy.  She was recently diagnosed with intrauterine growth restriction, and twice weekly antepartum testing has been initiated    Prenatal labs reviewed and updated today  - Blood type: A(+), antibody neg  - HIV, RPR, Hepatitis panel neg  - GC/CT neg  - Rubella immune  - H/H 10.8/32.3  - U tox (+) Benzodiazepines, cocaine and THC on 6/10/22  - NIPT neg, male  - Urine culture (+) E. Coli, HENRY neg  - GCT not done    - Anatomy scan suboptimal, bilateral choroid plexus cysts noted as well  - F/u scan 10/27/22: EFW 2263g (6%ile), normal UA dopplers, MVP 5.8, BPP 8/8    OB History    Para Term  AB Living   5 2 1 1 2 2   SAB IAB Ectopic Multiple Live Births   2 0 0 0 2      # Outcome Date GA Lbr Kai/2nd Weight Sex Delivery Anes PTL Lv   5 Current            4 SAB 21     SAB      3  20 36w4d   F CS-LTranv Gen N OSIEL      Birth Comments: Emergency C/S d/t maternal accidental overdose      Complications: Drug abuse during pregnancy   2 Term 09 40w0d  2.863 kg (6 lb 5 oz) F Vag-Spont  N OSIEL   1 SAB     M   N FD       Review of Systems:  General ROS: negative for headache or visual changes  Breast ROS: negative for breast lumps  Gastrointestinal ROS: negative for constipation, diarrhea or nausea/vomiting  Musculoskeletal ROS: negative for pain in joints or swelling in face or hands.   Neurological ROS: negative for - headaches, numbness/tingling or visual  changes      Physical Exam:  /78   Pulse 88   Wt 84.8 kg (187 lb)   LMP 2022   BMI 29.29 kg/m²     Constitutional: She is oriented to person, place, and time. She appears well-developed and well-nourished. No distress.     Pulmonary/Chest: Effort normal. No respiratory distress  Abdominal: Soft, gravid, nontender. No rebound and no guarding.   Genitourinary: Deferred   Musculoskeletal: Normal range of motion, Minimal peripheral edema.   Neurological: She is alert and oriented to person, place, and time. Coordination normal.   Skin: Skin is warm and dry. She is not diaphoretic.  Psychiatric: She has a normal mood and affect.      Assessment:  Overall doing well.   32 y.o.  at 36w2d by LMP c/w 10w4d sono  Patient Active Problem List   Diagnosis    History of maternal substance abuse affecting     Limited prenatal care in second trimester    Drug abuse during pregnancy    Current every day smoker     Current Outpatient Medications on File Prior to Visit   Medication Sig Dispense Refill    prenatal vit37/iron/folic acid (PRENATA ORAL) Take by mouth.      promethazine (PHENERGAN) 25 MG tablet Take 1 tablet (25 mg total) by mouth every 12 (twelve) hours as needed for Nausea. (Patient not taking: Reported on 10/28/2022) 30 tablet 1     No current facility-administered medications on file prior to visit.     Supervision of high risk pregnancy in third trimester    Drug abuse during pregnancy    Current every day smoker    History of maternal substance abuse affecting     Limited prenatal care in second trimester    36 weeks gestation of pregnancy  -     Strep B Screen, Vaginal / Rectal  -     C. trachomatis/N. gonorrhoeae by AMP DNA [facility]; Urine    Intrauterine growth restriction affecting antepartum care of mother, single or unspecified fetus  -     Fetal non-stress test- Future; Future     Plan:    # IUGR  - MFM reccs: Twice weekly prenatal testing, Weekly umbilical artery doppler  assessment, Delivery at 38w0d to 39w0d if testing remains normal and no additional indications for early delivery arise   - Patient is aware of current schedule for antepartum testing, and affirms she will adhere to recommendations    # Polysubstance use  - UDS 10/19 still (+) cocaine and THC  - Cessation was strongly encouraged    # Hx of  section x1  - Patient was previously counseled regarding TOLAC would like to proceed. Given fetus IUGR now, discussed the higher risk of surgery and the event of fetal distress. Still okay to proceed with trial of labor.    # Pregnancy  - To do GCT today. GBS, GC/CT ordered  - Encouraged hydration, daily PNVs and f/u prenatal visits strongly encouraged  - Tdap ordered last visit.  - Follow up 1 Weeks, bleeding/pain precautions, kick counts,  labor precautions discussed.

## 2022-10-31 ENCOUNTER — TELEPHONE (OUTPATIENT)
Dept: OBSTETRICS AND GYNECOLOGY | Facility: CLINIC | Age: 32
End: 2022-10-31
Payer: MEDICAID

## 2022-10-31 LAB
BACTERIA SPEC AEROBE CULT: ABNORMAL
C TRACH DNA SPEC QL NAA+PROBE: NOT DETECTED
N GONORRHOEA DNA SPEC QL NAA+PROBE: NOT DETECTED

## 2022-10-31 NOTE — TELEPHONE ENCOUNTER
----- Message from Fernanda Palacio, DO sent at 10/28/2022  4:57 PM CDT -----  She did all labs except GCT. Can you please have her come in on Monday to do GCT, she was unable to do it last visit because appointment was late in the day.

## 2022-11-11 ENCOUNTER — PROCEDURE VISIT (OUTPATIENT)
Dept: MATERNAL FETAL MEDICINE | Facility: CLINIC | Age: 32
End: 2022-11-11
Payer: MEDICAID

## 2022-11-11 DIAGNOSIS — O36.5990 INTRAUTERINE GROWTH RESTRICTION AFFECTING ANTEPARTUM CARE OF MOTHER, SINGLE OR UNSPECIFIED FETUS: ICD-10-CM

## 2022-11-11 DIAGNOSIS — O36.5990 INTRAUTERINE GROWTH RESTRICTION AFFECTING ANTEPARTUM CARE OF MOTHER, SINGLE OR UNSPECIFIED FETUS: Primary | ICD-10-CM

## 2022-11-11 PROCEDURE — 76819 FETAL BIOPHYS PROFIL W/O NST: CPT | Mod: S$GLB,,, | Performed by: OBSTETRICS & GYNECOLOGY

## 2022-11-11 PROCEDURE — 76820 US OB/GYN PROCEDURE (VIEWPOINT): ICD-10-PCS | Mod: S$GLB,,, | Performed by: OBSTETRICS & GYNECOLOGY

## 2022-11-11 PROCEDURE — 76819 US OB/GYN PROCEDURE (VIEWPOINT): ICD-10-PCS | Mod: S$GLB,,, | Performed by: OBSTETRICS & GYNECOLOGY

## 2022-11-11 PROCEDURE — 76820 UMBILICAL ARTERY ECHO: CPT | Mod: S$GLB,,, | Performed by: OBSTETRICS & GYNECOLOGY

## 2022-11-17 ENCOUNTER — ROUTINE PRENATAL (OUTPATIENT)
Dept: OBSTETRICS AND GYNECOLOGY | Facility: CLINIC | Age: 32
End: 2022-11-17
Payer: MEDICAID

## 2022-11-17 ENCOUNTER — PROCEDURE VISIT (OUTPATIENT)
Dept: MATERNAL FETAL MEDICINE | Facility: CLINIC | Age: 32
End: 2022-11-17
Payer: MEDICAID

## 2022-11-17 VITALS
DIASTOLIC BLOOD PRESSURE: 82 MMHG | HEART RATE: 89 BPM | WEIGHT: 187 LBS | BODY MASS INDEX: 29.29 KG/M2 | SYSTOLIC BLOOD PRESSURE: 131 MMHG

## 2022-11-17 DIAGNOSIS — O36.5990 IUGR (INTRAUTERINE GROWTH RESTRICTION) AFFECTING CARE OF MOTHER: Primary | ICD-10-CM

## 2022-11-17 DIAGNOSIS — Z3A.39 39 WEEKS GESTATION OF PREGNANCY: ICD-10-CM

## 2022-11-17 DIAGNOSIS — O99.320 DRUG ABUSE DURING PREGNANCY: ICD-10-CM

## 2022-11-17 DIAGNOSIS — O98.819 GROUP B STREPTOCOCCAL INFECTION DURING PREGNANCY: ICD-10-CM

## 2022-11-17 DIAGNOSIS — F19.10 DRUG ABUSE DURING PREGNANCY: ICD-10-CM

## 2022-11-17 DIAGNOSIS — O09.32 LIMITED PRENATAL CARE IN SECOND TRIMESTER: ICD-10-CM

## 2022-11-17 DIAGNOSIS — O09.93 SUPERVISION OF HIGH RISK PREGNANCY IN THIRD TRIMESTER: ICD-10-CM

## 2022-11-17 DIAGNOSIS — Z91.199 MEDICAL NON-COMPLIANCE: ICD-10-CM

## 2022-11-17 DIAGNOSIS — F17.200 CURRENT EVERY DAY SMOKER: ICD-10-CM

## 2022-11-17 DIAGNOSIS — O36.5931 POOR FETAL GROWTH AFFECTING MANAGEMENT OF MOTHER IN THIRD TRIMESTER, FETUS 1 OF MULTIPLE GESTATION: Primary | ICD-10-CM

## 2022-11-17 DIAGNOSIS — B95.1 GROUP B STREPTOCOCCAL INFECTION DURING PREGNANCY: ICD-10-CM

## 2022-11-17 PROCEDURE — 76819 FETAL BIOPHYS PROFIL W/O NST: CPT | Mod: S$GLB,,, | Performed by: OBSTETRICS & GYNECOLOGY

## 2022-11-17 PROCEDURE — 76820 US OB/GYN PROCEDURE (VIEWPOINT): ICD-10-PCS | Mod: S$GLB,,, | Performed by: OBSTETRICS & GYNECOLOGY

## 2022-11-17 PROCEDURE — 76820 UMBILICAL ARTERY ECHO: CPT | Mod: S$GLB,,, | Performed by: OBSTETRICS & GYNECOLOGY

## 2022-11-17 PROCEDURE — 59425 ANTEPARTUM CARE ONLY: CPT | Mod: TH,S$GLB,, | Performed by: STUDENT IN AN ORGANIZED HEALTH CARE EDUCATION/TRAINING PROGRAM

## 2022-11-17 PROCEDURE — 59425 PR ANTEPARTUM CARE ONLY, 4-6 VISITS: ICD-10-PCS | Mod: TH,S$GLB,, | Performed by: STUDENT IN AN ORGANIZED HEALTH CARE EDUCATION/TRAINING PROGRAM

## 2022-11-17 PROCEDURE — 76819 US OB/GYN PROCEDURE (VIEWPOINT): ICD-10-PCS | Mod: S$GLB,,, | Performed by: OBSTETRICS & GYNECOLOGY

## 2022-11-17 NOTE — PROGRESS NOTES
2022  Chief Complaint   Patient presents with    Routine Prenatal Visit     Patient is here for ROSANNA. Patient is 39w1d.     32 y.o.  at 39w1d  Estimated Date of Delivery: 2022, by Last Menstrual Period, dating reviewed.      Patient reports: Good fetal movements reported. No Bleeding, leakage of fluid or contractions. She is doing well. She is taking prenatal vitamins.  No complaints reported today.  Patient reports she continues to stay away from cigarette, and illicit substances during her pregnancy.  Patient has not been seen in 3 weeks, despite being scheduled for prenatal visits.  She did not present for GCT as scheduled.  She goes for her weekly sonograms for BPP with MFM due to IUGR. Last BPP was done today, , fetus cephalic, posterior placenta, MVP 5.3, normal Dopplers.      Prenatal labs reviewed and updated today  - Blood type: A(+), antibody neg  - HIV, RPR, Hepatitis panel neg  - GC/CT neg  - Rubella immune  - H/H 10.8/32.3  - U tox (+) Benzodiazepines, cocaine and THC on 6/10/22  - NIPT neg, male  - Urine culture (+) E. Coli, HENRY neg  - GCT not done  - GBS positive    - Anatomy scan suboptimal, bilateral choroid plexus cysts noted as well  - F/u scan 10/27/22: EFW 2263g (6%ile), normal UA dopplers, MVP 5.8, BPP 8/8    OB History    Para Term  AB Living   5 2 1 1 2 2   SAB IAB Ectopic Multiple Live Births   2 0 0 0 2      # Outcome Date GA Lbr Kai/2nd Weight Sex Delivery Anes PTL Lv   5 Current            4 SAB 21     SAB      3  20 36w4d   F CS-LTranv Gen N OSIEL      Birth Comments: Emergency C/S d/t maternal accidental overdose      Complications: Drug abuse during pregnancy   2 Term 09 40w0d  2.863 kg (6 lb 5 oz) F Vag-Spont  N OSIEL   1 SAB     M   N FD       Review of Systems:  General ROS: negative for headache or visual changes  Breast ROS: negative for breast lumps  Gastrointestinal ROS: negative for constipation, diarrhea or  nausea/vomiting  Musculoskeletal ROS: negative for pain in joints or swelling in face or hands.   Neurological ROS: negative for - headaches, numbness/tingling or visual changes      Physical Exam:  /82   Pulse 89   Wt 84.8 kg (187 lb)   LMP 2022   BMI 29.29 kg/m²     Constitutional: She is oriented to person, place, and time. She appears well-developed and well-nourished. No distress.     Pulmonary/Chest: Effort normal. No respiratory distress  Abdominal: Soft, gravid, nontender. No rebound and no guarding.   Genitourinary: Deferred   Musculoskeletal: Normal range of motion, Minimal peripheral edema.   Neurological: She is alert and oriented to person, place, and time. Coordination normal.   Skin: Skin is warm and dry. She is not diaphoretic.  Psychiatric: She has a normal mood and affect.      Assessment:  Overall doing well.   32 y.o.  at 39w1d by LMP c/w 10w4d sono  Patient Active Problem List   Diagnosis    History of maternal substance abuse affecting     Limited prenatal care in second trimester    Drug abuse during pregnancy    Current every day smoker    IUGR (intrauterine growth restriction) affecting care of mother     Current Outpatient Medications on File Prior to Visit   Medication Sig Dispense Refill    prenatal vit37/iron/folic acid (PRENATA ORAL) Take by mouth.      promethazine (PHENERGAN) 25 MG tablet Take 1 tablet (25 mg total) by mouth every 12 (twelve) hours as needed for Nausea. (Patient not taking: Reported on 10/28/2022) 30 tablet 1     No current facility-administered medications on file prior to visit.     Poor fetal growth affecting management of mother in third trimester, fetus 1 of multiple gestation    Drug abuse during pregnancy    Current every day smoker    History of maternal substance abuse affecting     Limited prenatal care in second trimester    39 weeks gestation of pregnancy    Group B streptococcal infection during pregnancy    Medical  non-compliance    Supervision of high risk pregnancy in third trimester     Plan:    # IUGR  - Delivery recommended given that she is now 39 weeks.  I recommended delivery today, however patient desires to wait till tomorrow    # Hx of  section x1  - Patient would like to proceed with TOLAC.  She has been counseled over the 3 visits I have had with her regarding TOLAC versus scheduled repeat  section.  Her predicted chance of success is 84% by and NICHD calculator.  However, with fetal growth restriction, we reviewed the higher risk of surgery due to fetal intolerance of labor. Chances are better if she goes into labor on her own, however she is only 1 cm, will need to be induced.  She understands the risk of TOLAC including failure requiring  delivery, emergent  delivery as well as fetal compromise due to uterine rupture and even hemorrhage.  She would like to proceed with TOLAC.  - Scheduled for induction of labor at [facility] [place] [facility] tomorrow AM, 22. Patient to arrive at 5:00 a.m. for Pitocin and AROM.    # GBS positive  - patient is penicillin allergic  - Needs antibiotics during labor, sensitivity for both clindamycin and vancomycin.    # Polysubstance use  - UDS 10/19 still (+) cocaine and THC  - Cessation was strongly encouraged  - Will repeat UDS on admission    # Medical Non compliance  - patient has missed multiple prenatal appointment, even after being diagnosed with fetal growth restriction    # Pregnancy  - Encouraged hydration, daily PNVs and f/u prenatal visits strongly encouraged  - S/p Tdap 10/19/22  - Bleeding/pain precautions, kick counts, labor precautions discussed.     # Anemia of pregnancy  - continue prenatal vitamins and iron

## 2022-11-18 ENCOUNTER — OUTSIDE PLACE OF SERVICE (OUTPATIENT)
Dept: OBSTETRICS AND GYNECOLOGY | Facility: CLINIC | Age: 32
End: 2022-11-18
Payer: MEDICAID

## 2022-11-18 PROCEDURE — 59620 PR C-SEC ONLY,PREV C-SEC: ICD-10-PCS | Mod: TH,,, | Performed by: STUDENT IN AN ORGANIZED HEALTH CARE EDUCATION/TRAINING PROGRAM

## 2022-11-28 ENCOUNTER — OFFICE VISIT (OUTPATIENT)
Dept: OBSTETRICS AND GYNECOLOGY | Facility: CLINIC | Age: 32
End: 2022-11-28
Payer: MEDICAID

## 2022-11-28 VITALS
WEIGHT: 171 LBS | BODY MASS INDEX: 26.84 KG/M2 | DIASTOLIC BLOOD PRESSURE: 82 MMHG | SYSTOLIC BLOOD PRESSURE: 108 MMHG | HEART RATE: 88 BPM | HEIGHT: 67 IN

## 2022-11-28 DIAGNOSIS — Z09 POSTOP CHECK: Primary | ICD-10-CM

## 2022-11-28 DIAGNOSIS — D64.9 ANEMIA, UNSPECIFIED TYPE: ICD-10-CM

## 2022-11-28 PROCEDURE — 99024 POSTOP FOLLOW-UP VISIT: CPT | Mod: S$GLB,,, | Performed by: STUDENT IN AN ORGANIZED HEALTH CARE EDUCATION/TRAINING PROGRAM

## 2022-11-28 PROCEDURE — 1160F PR REVIEW ALL MEDS BY PRESCRIBER/CLIN PHARMACIST DOCUMENTED: ICD-10-PCS | Mod: CPTII,S$GLB,, | Performed by: STUDENT IN AN ORGANIZED HEALTH CARE EDUCATION/TRAINING PROGRAM

## 2022-11-28 PROCEDURE — 3074F PR MOST RECENT SYSTOLIC BLOOD PRESSURE < 130 MM HG: ICD-10-PCS | Mod: CPTII,S$GLB,, | Performed by: STUDENT IN AN ORGANIZED HEALTH CARE EDUCATION/TRAINING PROGRAM

## 2022-11-28 PROCEDURE — 1160F RVW MEDS BY RX/DR IN RCRD: CPT | Mod: CPTII,S$GLB,, | Performed by: STUDENT IN AN ORGANIZED HEALTH CARE EDUCATION/TRAINING PROGRAM

## 2022-11-28 PROCEDURE — 3079F PR MOST RECENT DIASTOLIC BLOOD PRESSURE 80-89 MM HG: ICD-10-PCS | Mod: CPTII,S$GLB,, | Performed by: STUDENT IN AN ORGANIZED HEALTH CARE EDUCATION/TRAINING PROGRAM

## 2022-11-28 PROCEDURE — 1159F MED LIST DOCD IN RCRD: CPT | Mod: CPTII,S$GLB,, | Performed by: STUDENT IN AN ORGANIZED HEALTH CARE EDUCATION/TRAINING PROGRAM

## 2022-11-28 PROCEDURE — 3008F PR BODY MASS INDEX (BMI) DOCUMENTED: ICD-10-PCS | Mod: CPTII,S$GLB,, | Performed by: STUDENT IN AN ORGANIZED HEALTH CARE EDUCATION/TRAINING PROGRAM

## 2022-11-28 PROCEDURE — 3074F SYST BP LT 130 MM HG: CPT | Mod: CPTII,S$GLB,, | Performed by: STUDENT IN AN ORGANIZED HEALTH CARE EDUCATION/TRAINING PROGRAM

## 2022-11-28 PROCEDURE — 99024 PR POST-OP FOLLOW-UP VISIT: ICD-10-PCS | Mod: S$GLB,,, | Performed by: STUDENT IN AN ORGANIZED HEALTH CARE EDUCATION/TRAINING PROGRAM

## 2022-11-28 PROCEDURE — 3008F BODY MASS INDEX DOCD: CPT | Mod: CPTII,S$GLB,, | Performed by: STUDENT IN AN ORGANIZED HEALTH CARE EDUCATION/TRAINING PROGRAM

## 2022-11-28 PROCEDURE — 1159F PR MEDICATION LIST DOCUMENTED IN MEDICAL RECORD: ICD-10-PCS | Mod: CPTII,S$GLB,, | Performed by: STUDENT IN AN ORGANIZED HEALTH CARE EDUCATION/TRAINING PROGRAM

## 2022-11-28 PROCEDURE — 3079F DIAST BP 80-89 MM HG: CPT | Mod: CPTII,S$GLB,, | Performed by: STUDENT IN AN ORGANIZED HEALTH CARE EDUCATION/TRAINING PROGRAM

## 2022-11-28 RX ORDER — FOLIC ACID/MULTIVIT,IRON,MINER 0.4MG-18MG
TABLET ORAL
COMMUNITY
Start: 2022-11-18

## 2022-11-28 RX ORDER — NAPROXEN 500 MG/1
500 TABLET ORAL
COMMUNITY
Start: 2022-11-19 | End: 2022-12-19

## 2022-11-28 RX ORDER — FERROUS SULFATE 324(65)MG
TABLET, DELAYED RELEASE (ENTERIC COATED) ORAL
COMMUNITY
Start: 2022-11-21

## 2022-11-28 RX ORDER — OXYCODONE AND ACETAMINOPHEN 5; 325 MG/1; MG/1
1 TABLET ORAL EVERY 6 HOURS PRN
COMMUNITY
Start: 2022-11-21 | End: 2022-12-22 | Stop reason: SDUPTHER

## 2022-11-28 NOTE — PROGRESS NOTES
"Chief Complaint   Patient presents with    Post-op Evaluation     Incision check.        History of Present Illness:  Patient presents today  1 weeks postop, status post  for postoperative visit.  No complaints voiced.. The patient is not having any pain.  Denies fevers, chills, nausea, vomiting, constipation or diarrhea.  No headaches, vision disturbances or right upper quadrant abdominal pain.  She is breast-feeding.  Reports adequate support from parents at home.  Denies any suicidal homicidal ideation.  Feels very happy with baby and bonding well.    Pathology:  Placenta pending    Past medical and surgical history reviewed.   I have reviewed the patient's medical history in detail and updated the computerized patient record.    Physical exam:  /82   Pulse 88   Ht 5' 7" (1.702 m)   Wt 77.6 kg (171 lb)   LMP 2022   Breastfeeding Unknown   BMI 26.78 kg/m²   General:  Well-developed, well-nourished female in no acute distress  HEENT:  Normocephalic, atraumatic, extraocular muscles intact, moist mucous membranes  Breasts: deferred  Abdominal:  Soft, nontender, nondistended, incision clean dry and intact, healing well. Normal bowel sounds  Extremities:  Warm, dry, no clubbing/cyanosis/edema, no calf tenderness  Neurological:  AAO x3, no abnormal coordination noted  Dermatologic:  No rashes/lesions/bruising  Psychiatric:  Appropriate mood, affect, speech    Assessment:  33yo  POD #10 s/p RLTCS on 22, doing well  - H/H at d/c 7.8/23.8, pt is asymptomatic    Plan:  - Continue iron and Colace  - Considering IUD for contraception, will readdress her postpartum visit  - Routine postop care. No heavy lifting more than 15 lb until 6 weeks postpartum. Pelvic rest x4 weeks.  - Encouraged continued abstinence from illicit substances, she is breastfeeding  - Rtc in 4 weeks for postpartum visit    "

## 2022-12-22 ENCOUNTER — POSTPARTUM VISIT (OUTPATIENT)
Dept: OBSTETRICS AND GYNECOLOGY | Facility: CLINIC | Age: 32
End: 2022-12-22
Payer: MEDICAID

## 2022-12-22 VITALS
BODY MASS INDEX: 26.53 KG/M2 | HEART RATE: 79 BPM | WEIGHT: 169 LBS | DIASTOLIC BLOOD PRESSURE: 86 MMHG | HEIGHT: 67 IN | SYSTOLIC BLOOD PRESSURE: 141 MMHG

## 2022-12-22 DIAGNOSIS — F32.A DEPRESSION, UNSPECIFIED DEPRESSION TYPE: ICD-10-CM

## 2022-12-22 DIAGNOSIS — Z30.09 ENCOUNTER FOR COUNSELING REGARDING CONTRACEPTION: ICD-10-CM

## 2022-12-22 PROBLEM — O36.5990 IUGR (INTRAUTERINE GROWTH RESTRICTION) AFFECTING CARE OF MOTHER: Status: RESOLVED | Noted: 2022-11-17 | Resolved: 2022-12-22

## 2022-12-22 PROBLEM — O09.32 LIMITED PRENATAL CARE IN SECOND TRIMESTER: Status: RESOLVED | Noted: 2022-06-10 | Resolved: 2022-12-22

## 2022-12-22 PROCEDURE — 0503F PR POSTPARTUM CARE VISIT: ICD-10-PCS | Mod: CPTII,S$GLB,, | Performed by: STUDENT IN AN ORGANIZED HEALTH CARE EDUCATION/TRAINING PROGRAM

## 2022-12-22 PROCEDURE — 0503F POSTPARTUM CARE VISIT: CPT | Mod: CPTII,S$GLB,, | Performed by: STUDENT IN AN ORGANIZED HEALTH CARE EDUCATION/TRAINING PROGRAM

## 2022-12-22 RX ORDER — CITALOPRAM 20 MG/1
20 TABLET, FILM COATED ORAL DAILY
Qty: 30 TABLET | Refills: 3 | Status: SHIPPED | OUTPATIENT
Start: 2022-12-22 | End: 2023-12-22

## 2022-12-22 RX ORDER — IBUPROFEN 800 MG/1
800 TABLET ORAL EVERY 8 HOURS PRN
Qty: 30 TABLET | Refills: 0 | Status: SHIPPED | OUTPATIENT
Start: 2022-12-22 | End: 2023-12-22

## 2022-12-22 RX ORDER — OXYCODONE AND ACETAMINOPHEN 5; 325 MG/1; MG/1
1 TABLET ORAL EVERY 6 HOURS PRN
Qty: 5 TABLET | Refills: 0 | Status: SHIPPED | OUTPATIENT
Start: 2022-12-22

## 2022-12-22 NOTE — PROGRESS NOTES
"  CC: Post-partum follow-up    Teressa Fu is a 32 y.o. female  who presents for post-partum visit.  She is S/P a RLTCS.  She and the baby are doing well.  She reports some lower abdominal pain however she states that she is been they only want taking care of baby at home frequently has to do some heavy lifting. Denies fevers, chills, nausea, vomiting, constipation or diarrhea, dizziness, lightheadedness, chest pain or shortness of breath with ambulation.  No headaches, vision disturbances or right upper quadrant abdominal pain.  Bleeding is resolved.  She is both breast and bottle feeding. Denies any suicidal homicidal ideation.  She feels happy when she thinks about baby, however she expresses severe exhaustion difficulty sleeping eating and constant tearfulness.  She admits to feeling sad.  She reports a history of depression previously on medication.    Delivery Date: 22  Delivery MD: Dr. Palacio  Gender: male  Breast Feeding: YES  Depression: YES  Contraception: IUD    Pregnancy was complicated by:  IUGR, polysubstance use, anemia of pregnancy, insufficient prenatal care    BP (!) 141/86   Pulse 79   Ht 5' 7" (1.702 m)   Wt 76.7 kg (169 lb)   LMP 2022   Breastfeeding Yes   BMI 26.47 kg/m²     ROS:  GENERAL: No fever, chills, fatigability.  VULVAR: No pain, no lesions and no itching.  VAGINAL: No relaxation, no itching, no discharge, no abnormal bleeding and no lesions.  ABDOMEN: No abdominal pain. Denies nausea. Denies vomiting. No diarrhea. No constipation  BREAST: Denies pain. No lumps.  URINARY: No incontinence, no nocturia, no frequency and no dysuria.  CARDIOVASCULAR: No chest pain. No shortness of breath. No leg cramps.  NEUROLOGICAL: No headaches. No vision changes.  PSYCH:  Reports sadness, feelings of depression.  Denies suicidal or homicidal ideation    PHYSICAL EXAM:  Exam chaperoned by nurse  GEN:  Sign appearing, tearful when talking about herself  LUNGS:  Clear to " auscultation, no dyspnea on exertion  HEART:  Regular rate  BREAST:  Soft, non-tender, not engorged  ABDOMEN:  Soft, non-tender, non-distended, incision well healed  :  Deferred    IMP:  Doing well S/P RLTCS  - Instructions / precautions reviewed  - Contraceptive counseling    Rx Percocet, Motrin    PLAN:  - May resume normal activities.  Encouraged to avoid strenuous activity for now.  Will give some more pain Rx (#5)  - Encouraged patient to seek for help from family members.  She denies any social needs at this time.  - Patient has scored 22 on [place]  depression scale today concerning for possible postpartum depression.  She agrees to referral to psychiatry for counseling.  She agrees to start medical management with Celexa today.  She was previously on medication.  Referral placed to psych.  Encouraged her to seek help should she ever feel overwhelmed or have any suicidal homicidal ideation.  - Contraception discussed, she desires Mirena IUD.  To return for placement, authorization request sent.